# Patient Record
Sex: MALE | Race: WHITE | Employment: FULL TIME | URBAN - METROPOLITAN AREA
[De-identification: names, ages, dates, MRNs, and addresses within clinical notes are randomized per-mention and may not be internally consistent; named-entity substitution may affect disease eponyms.]

---

## 2017-04-14 ENCOUNTER — HOSPITAL ENCOUNTER (OUTPATIENT)
Dept: GENERAL RADIOLOGY | Age: 37
Discharge: HOME | End: 2017-04-14
Attending: FAMILY MEDICINE
Payer: COMMERCIAL

## 2017-04-14 ENCOUNTER — OFFICE VISIT (OUTPATIENT)
Dept: FAMILY MEDICINE CLINIC | Facility: CLINIC | Age: 37
End: 2017-04-14

## 2017-04-14 VITALS
SYSTOLIC BLOOD PRESSURE: 134 MMHG | WEIGHT: 187 LBS | TEMPERATURE: 98 F | BODY MASS INDEX: 25 KG/M2 | HEART RATE: 62 BPM | DIASTOLIC BLOOD PRESSURE: 84 MMHG

## 2017-04-14 DIAGNOSIS — S93.491A: ICD-10-CM

## 2017-04-14 DIAGNOSIS — S93.491A: Primary | ICD-10-CM

## 2017-04-14 PROCEDURE — 99212 OFFICE O/P EST SF 10 MIN: CPT | Performed by: FAMILY MEDICINE

## 2017-04-14 PROCEDURE — 99213 OFFICE O/P EST LOW 20 MIN: CPT | Performed by: FAMILY MEDICINE

## 2017-04-14 PROCEDURE — A6449 LT COMPRES BAND >=3" <5"/YD: HCPCS | Performed by: FAMILY MEDICINE

## 2017-04-14 PROCEDURE — 73610 X-RAY EXAM OF ANKLE: CPT

## 2017-04-14 NOTE — PROGRESS NOTES
Blood pressure 134/84, pulse 62, temperature 98 °F (36.7 °C), temperature source Oral, weight 187 lb (84.823 kg). Right ankle sprain that occurred today. Twisted his ankle by inversion stepping in a pothole.   Was unable to bear weight on it in the early

## 2017-04-17 ENCOUNTER — TELEPHONE (OUTPATIENT)
Dept: FAMILY MEDICINE CLINIC | Facility: CLINIC | Age: 37
End: 2017-04-17

## 2017-04-17 NOTE — TELEPHONE ENCOUNTER
Spoke with patient (identified name and ), results reviewed and agrees with plan. Patient reports decresing pain and swelling, plans to continue ace wrap and minimize weight bearing for the first week.  Follow up if pain or swelling persists

## 2017-04-17 NOTE — TELEPHONE ENCOUNTER
----- Message from Sera Patel DO sent at 4/14/2017  4:23 PM CDT -----  ANKLE X-RAY NEGATIVE FOR FRACTURE.

## 2018-10-27 ENCOUNTER — IMMUNIZATION (OUTPATIENT)
Dept: FAMILY MEDICINE CLINIC | Facility: CLINIC | Age: 38
End: 2018-10-27
Payer: COMMERCIAL

## 2018-10-27 DIAGNOSIS — Z23 NEED FOR VACCINATION: ICD-10-CM

## 2018-10-27 PROCEDURE — 90471 IMMUNIZATION ADMIN: CPT | Performed by: NURSE PRACTITIONER

## 2018-10-27 PROCEDURE — 90686 IIV4 VACC NO PRSV 0.5 ML IM: CPT | Performed by: NURSE PRACTITIONER

## 2020-02-11 ENCOUNTER — TELEPHONE (OUTPATIENT)
Dept: FAMILY MEDICINE CLINIC | Facility: CLINIC | Age: 40
End: 2020-02-11

## 2020-02-11 NOTE — TELEPHONE ENCOUNTER
Please see below, patient scheduled appointment via [employer].      Appointment For: Santos Sue ([id number])   Visit Type: Kita Shirley ([id number])      2/13/2020   10:00 AM  20 mins. Andrew Cook DO   [facility]      Patient Comments:   Having

## 2020-02-12 NOTE — TELEPHONE ENCOUNTER
Called patient, states \"  been feeling off \" lately,  Had \"epiosde \" last month and then again Sunday   Feels like \"tingling,numbness \" usually in his legs,can full weight bear both legs,     States \" can wait until the appointment tomorrow \"    Ad

## 2020-02-13 ENCOUNTER — LAB ENCOUNTER (OUTPATIENT)
Dept: LAB | Age: 40
End: 2020-02-13
Attending: FAMILY MEDICINE
Payer: COMMERCIAL

## 2020-02-13 ENCOUNTER — OFFICE VISIT (OUTPATIENT)
Dept: FAMILY MEDICINE CLINIC | Facility: CLINIC | Age: 40
End: 2020-02-13
Payer: COMMERCIAL

## 2020-02-13 VITALS
HEART RATE: 64 BPM | TEMPERATURE: 98 F | WEIGHT: 194 LBS | RESPIRATION RATE: 20 BRPM | BODY MASS INDEX: 25.99 KG/M2 | DIASTOLIC BLOOD PRESSURE: 95 MMHG | SYSTOLIC BLOOD PRESSURE: 143 MMHG | HEIGHT: 72.5 IN

## 2020-02-13 DIAGNOSIS — R20.2 PARESTHESIAS: Primary | ICD-10-CM

## 2020-02-13 DIAGNOSIS — R20.2 PARESTHESIAS: ICD-10-CM

## 2020-02-13 LAB
ALBUMIN SERPL-MCNC: 4.3 G/DL (ref 3.4–5)
ALBUMIN/GLOB SERPL: 1.2 {RATIO} (ref 1–2)
ALP LIVER SERPL-CCNC: 47 U/L (ref 45–117)
ALT SERPL-CCNC: 20 U/L (ref 16–61)
ANION GAP SERPL CALC-SCNC: 5 MMOL/L (ref 0–18)
AST SERPL-CCNC: 19 U/L (ref 15–37)
BASOPHILS # BLD AUTO: 0.03 X10(3) UL (ref 0–0.2)
BASOPHILS NFR BLD AUTO: 0.6 %
BILIRUB SERPL-MCNC: 1 MG/DL (ref 0.1–2)
BUN BLD-MCNC: 12 MG/DL (ref 7–18)
BUN/CREAT SERPL: 11.1 (ref 10–20)
CALCIUM BLD-MCNC: 9.3 MG/DL (ref 8.5–10.1)
CHLORIDE SERPL-SCNC: 103 MMOL/L (ref 98–112)
CO2 SERPL-SCNC: 30 MMOL/L (ref 21–32)
CREAT BLD-MCNC: 1.08 MG/DL (ref 0.7–1.3)
DEPRECATED RDW RBC AUTO: 41.5 FL (ref 35.1–46.3)
EOSINOPHIL # BLD AUTO: 0.13 X10(3) UL (ref 0–0.7)
EOSINOPHIL NFR BLD AUTO: 2.6 %
ERYTHROCYTE [DISTWIDTH] IN BLOOD BY AUTOMATED COUNT: 12.5 % (ref 11–15)
EST. AVERAGE GLUCOSE BLD GHB EST-MCNC: 100 MG/DL (ref 68–126)
GLOBULIN PLAS-MCNC: 3.6 G/DL (ref 2.8–4.4)
GLUCOSE BLD-MCNC: 83 MG/DL (ref 70–99)
HBA1C MFR BLD HPLC: 5.1 % (ref ?–5.7)
HCT VFR BLD AUTO: 49.7 % (ref 39–53)
HGB BLD-MCNC: 16.9 G/DL (ref 13–17.5)
IMM GRANULOCYTES # BLD AUTO: 0.01 X10(3) UL (ref 0–1)
IMM GRANULOCYTES NFR BLD: 0.2 %
LYMPHOCYTES # BLD AUTO: 1.75 X10(3) UL (ref 1–4)
LYMPHOCYTES NFR BLD AUTO: 34.7 %
M PROTEIN MFR SERPL ELPH: 7.9 G/DL (ref 6.4–8.2)
MCH RBC QN AUTO: 30.7 PG (ref 26–34)
MCHC RBC AUTO-ENTMCNC: 34 G/DL (ref 31–37)
MCV RBC AUTO: 90.4 FL (ref 80–100)
MONOCYTES # BLD AUTO: 0.48 X10(3) UL (ref 0.1–1)
MONOCYTES NFR BLD AUTO: 9.5 %
NEUTROPHILS # BLD AUTO: 2.65 X10 (3) UL (ref 1.5–7.7)
NEUTROPHILS # BLD AUTO: 2.65 X10(3) UL (ref 1.5–7.7)
NEUTROPHILS NFR BLD AUTO: 52.4 %
OSMOLALITY SERPL CALC.SUM OF ELEC: 285 MOSM/KG (ref 275–295)
PATIENT FASTING Y/N/NP: YES
PLATELET # BLD AUTO: 260 10(3)UL (ref 150–450)
POTASSIUM SERPL-SCNC: 3.8 MMOL/L (ref 3.5–5.1)
RBC # BLD AUTO: 5.5 X10(6)UL (ref 4.3–5.7)
SODIUM SERPL-SCNC: 138 MMOL/L (ref 136–145)
TSI SER-ACNC: 2.08 MIU/ML (ref 0.36–3.74)
VIT B12 SERPL-MCNC: 281 PG/ML (ref 193–986)
WBC # BLD AUTO: 5.1 X10(3) UL (ref 4–11)

## 2020-02-13 PROCEDURE — 86780 TREPONEMA PALLIDUM: CPT

## 2020-02-13 PROCEDURE — 36415 COLL VENOUS BLD VENIPUNCTURE: CPT

## 2020-02-13 PROCEDURE — 80053 COMPREHEN METABOLIC PANEL: CPT

## 2020-02-13 PROCEDURE — 87389 HIV-1 AG W/HIV-1&-2 AB AG IA: CPT

## 2020-02-13 PROCEDURE — 85025 COMPLETE CBC W/AUTO DIFF WBC: CPT

## 2020-02-13 PROCEDURE — 84443 ASSAY THYROID STIM HORMONE: CPT

## 2020-02-13 PROCEDURE — 82607 VITAMIN B-12: CPT

## 2020-02-13 PROCEDURE — 99215 OFFICE O/P EST HI 40 MIN: CPT | Performed by: FAMILY MEDICINE

## 2020-02-13 PROCEDURE — 83036 HEMOGLOBIN GLYCOSYLATED A1C: CPT

## 2020-02-13 NOTE — PROGRESS NOTES
Tired  Tingling in legs martina lopezs then went away   now coming back  Sometime in arms and cheeks. (face)    No lower back pain   Has rt neck issue mild. No dm  mgf and dm    Not peeing a lot. Does drink a lot of water but not more so.     Works as was spent with the patient with the majority of time was spent in discussion. 40 minutes was spent discussing these issues. We discussed the pathogenesis of their disease and the treatment options. More than 50% of our time was spent in discussion.

## 2020-02-14 LAB — T PALLIDUM AB SER QL: NEGATIVE

## 2020-02-27 ENCOUNTER — PROCEDURE VISIT (OUTPATIENT)
Dept: NEUROLOGY | Facility: CLINIC | Age: 40
End: 2020-02-27
Payer: COMMERCIAL

## 2020-02-27 VITALS — WEIGHT: 190 LBS | BODY MASS INDEX: 25.73 KG/M2 | HEIGHT: 72 IN

## 2020-02-27 PROCEDURE — 95913 NRV CNDJ TEST 13/> STUDIES: CPT | Performed by: OTHER

## 2020-02-27 PROCEDURE — 95886 MUSC TEST DONE W/N TEST COMP: CPT | Performed by: OTHER

## 2020-02-27 NOTE — PROCEDURES
HYSICAL:    Cranial nerves grossly normal. Motor examination showed strength to be 5 of 5 throughout.      HISTORY:    Nik Mcdonald is a 35year old male with a complaint of numbness that came in every December 2019, resolved, but then came back again in Right deltoid, bicep, triceps, EDC, PT, FDI (hand) muscles were tested.     Motor NCS      Nerve / Sites Muscle Latency Amplitude Rel Amp Durat Segments Distance Lat Diff Velocity     ms mV % ms  cm ms m/s   R Median - APB      Wrist APB 3.33 12.7 100 7.92 ms ms µV µV  ms   R Median - DigII UlnarV      Median Wrist Dig II 3.07 3.96 33.8 73.1 Median Wrist - Dig II       Ulnar Wrist Dig V 3.18 4.06 24.9 61.6 Ulnar Wrist - Median Wrist 0.10   L Median - DigII UlnarV      Median Wrist Dig II 2.55 3.44 32.9 65. R. First dorsal interosseous Ulnar C8-T1 N None None None None Normal N N N N   L. Vastus medialis Femoral L2-L4 N None None None None Normal N N N N   R. Vastus medialis Femoral L2-L4 N None None None None Normal N N N N   L.  Vastus lateralis Femoral L2-L

## 2020-03-03 ENCOUNTER — TELEPHONE (OUTPATIENT)
Dept: FAMILY MEDICINE CLINIC | Facility: CLINIC | Age: 40
End: 2020-03-03

## 2020-03-03 DIAGNOSIS — R20.0 LEG NUMBNESS: Primary | ICD-10-CM

## 2020-03-03 NOTE — TELEPHONE ENCOUNTER
Per request pt is to follow-up with orthopedics with Dr. Miguel A Meyer. Referral pending. Please review and sign.

## 2020-03-12 ENCOUNTER — APPOINTMENT (OUTPATIENT)
Dept: LAB | Age: 40
End: 2020-03-12
Attending: FAMILY MEDICINE
Payer: COMMERCIAL

## 2020-03-12 ENCOUNTER — OFFICE VISIT (OUTPATIENT)
Dept: FAMILY MEDICINE CLINIC | Facility: CLINIC | Age: 40
End: 2020-03-12
Payer: COMMERCIAL

## 2020-03-12 VITALS
WEIGHT: 194 LBS | DIASTOLIC BLOOD PRESSURE: 85 MMHG | HEIGHT: 72 IN | HEART RATE: 56 BPM | BODY MASS INDEX: 26.28 KG/M2 | SYSTOLIC BLOOD PRESSURE: 125 MMHG

## 2020-03-12 DIAGNOSIS — R20.2 PARESTHESIAS: ICD-10-CM

## 2020-03-12 DIAGNOSIS — Z00.00 ANNUAL PHYSICAL EXAM: Primary | ICD-10-CM

## 2020-03-12 DIAGNOSIS — Z00.00 ANNUAL PHYSICAL EXAM: ICD-10-CM

## 2020-03-12 LAB
CHOLEST SMN-MCNC: 213 MG/DL (ref ?–200)
HDLC SERPL-MCNC: 53 MG/DL (ref 40–59)
LDLC SERPL CALC-MCNC: 135 MG/DL (ref ?–100)
NONHDLC SERPL-MCNC: 160 MG/DL (ref ?–130)
PATIENT FASTING Y/N/NP: YES
TRIGL SERPL-MCNC: 125 MG/DL (ref 30–149)
VLDLC SERPL CALC-MCNC: 25 MG/DL (ref 0–30)

## 2020-03-12 PROCEDURE — 36415 COLL VENOUS BLD VENIPUNCTURE: CPT

## 2020-03-12 PROCEDURE — 99396 PREV VISIT EST AGE 40-64: CPT | Performed by: FAMILY MEDICINE

## 2020-03-12 PROCEDURE — 80061 LIPID PANEL: CPT

## 2020-03-12 NOTE — PROGRESS NOTES
Pt here for his physical  REASON FOR VISIT:    Graciela Gibson is a 35year old male who presents for an [address]. Feels well   parestesias resolved. \"I would say 100% gone\"  emg results reviewed.         There is no problem list on file INFORMATION:   History reviewed. No pertinent past medical history. History reviewed. No pertinent surgical history.    Family History   Problem Relation Age of Onset   • Cancer Maternal Grandmother         cervical   • Other (Other) Mother         good h deferred  MUSCULOSKELETAL: back is not tender, FROM of the back  EXTREMITIES: no cyanosis, clubbing or edema  NEURO: Oriented times three, cranial nerves are intact, motor and sensory are grossly intact      ASSESSMENT AND OTHER RELEVANT CHRONIC CONDITIONS

## 2021-04-23 ENCOUNTER — HOSPITAL ENCOUNTER (OUTPATIENT)
Age: 41
Discharge: HOME | End: 2021-04-23
Attending: EMERGENCY MEDICINE
Payer: COMMERCIAL

## 2021-04-23 ENCOUNTER — APPOINTMENT (OUTPATIENT)
Dept: GENERAL RADIOLOGY | Age: 41
End: 2021-04-23
Attending: NURSE PRACTITIONER
Payer: COMMERCIAL

## 2021-04-23 VITALS
OXYGEN SATURATION: 99 % | TEMPERATURE: 98 F | HEART RATE: 57 BPM | SYSTOLIC BLOOD PRESSURE: 134 MMHG | DIASTOLIC BLOOD PRESSURE: 84 MMHG | RESPIRATION RATE: 16 BRPM

## 2021-04-23 DIAGNOSIS — S62.635A CLOSED DISPLACED FRACTURE OF DISTAL PHALANX OF LEFT RING FINGER, INITIAL ENCOUNTER: Primary | ICD-10-CM

## 2021-04-23 PROCEDURE — 26750 TREAT FINGER FRACTURE EACH: CPT

## 2021-04-23 PROCEDURE — 73140 X-RAY EXAM OF FINGER(S): CPT | Performed by: EMERGENCY MEDICINE

## 2021-04-23 PROCEDURE — 99203 OFFICE O/P NEW LOW 30 MIN: CPT

## 2021-04-23 PROCEDURE — 99213 OFFICE O/P EST LOW 20 MIN: CPT

## 2021-04-23 NOTE — ED PROVIDER NOTES
Patient Seen in: [facility]      History   Patient presents with:  Arm or Hand Injury: Severly pinched left ring finger - Entered by patient    Stated Complaint: Arm or Hand Injury - Severly pinched left ring finger    HPI/Subjective:   HPI Resp 16   SpO2 99%         Physical Exam  Vitals and nursing note reviewed. Constitutional:       Appearance: Normal appearance. He is well-developed. He is not ill-appearing or diaphoretic. HENT:      Head: Normocephalic and atraumatic.       Right E fracture of the proximal and dorsal aspect of the distal phalanx of the 4th digit. Moderate narrowing of the DIP joint and mild narrowing of the PIP joint of the 4th digit. No dislocation. SOFT TISSUES: Negative. No visible soft tissue swelling.  EFFUSION

## 2023-05-12 ENCOUNTER — OFFICE VISIT (OUTPATIENT)
Dept: FAMILY MEDICINE CLINIC | Facility: CLINIC | Age: 43
End: 2023-05-12
Payer: COMMERCIAL

## 2023-05-12 VITALS
TEMPERATURE: 98 F | HEART RATE: 70 BPM | BODY MASS INDEX: 25.41 KG/M2 | OXYGEN SATURATION: 98 % | WEIGHT: 187.63 LBS | DIASTOLIC BLOOD PRESSURE: 86 MMHG | HEIGHT: 72 IN | RESPIRATION RATE: 16 BRPM | SYSTOLIC BLOOD PRESSURE: 116 MMHG

## 2023-05-12 DIAGNOSIS — E66.3 OVERWEIGHT (BMI 25.0-29.9): ICD-10-CM

## 2023-05-12 DIAGNOSIS — E78.00 HYPERCHOLESTEROLEMIA: ICD-10-CM

## 2023-05-12 DIAGNOSIS — Z00.00 ROUTINE MEDICAL EXAM: Primary | ICD-10-CM

## 2023-05-12 PROCEDURE — 3008F BODY MASS INDEX DOCD: CPT | Performed by: STUDENT IN AN ORGANIZED HEALTH CARE EDUCATION/TRAINING PROGRAM

## 2023-05-12 PROCEDURE — 3079F DIAST BP 80-89 MM HG: CPT | Performed by: STUDENT IN AN ORGANIZED HEALTH CARE EDUCATION/TRAINING PROGRAM

## 2023-05-12 PROCEDURE — 3074F SYST BP LT 130 MM HG: CPT | Performed by: STUDENT IN AN ORGANIZED HEALTH CARE EDUCATION/TRAINING PROGRAM

## 2023-05-12 PROCEDURE — 99386 PREV VISIT NEW AGE 40-64: CPT | Performed by: STUDENT IN AN ORGANIZED HEALTH CARE EDUCATION/TRAINING PROGRAM

## 2025-03-04 ENCOUNTER — OFFICE VISIT (OUTPATIENT)
Dept: FAMILY MEDICINE CLINIC | Facility: CLINIC | Age: 45
End: 2025-03-04
Payer: COMMERCIAL

## 2025-03-04 VITALS
HEART RATE: 65 BPM | WEIGHT: 195.81 LBS | BODY MASS INDEX: 26.52 KG/M2 | SYSTOLIC BLOOD PRESSURE: 120 MMHG | DIASTOLIC BLOOD PRESSURE: 84 MMHG | HEIGHT: 72 IN | OXYGEN SATURATION: 97 % | RESPIRATION RATE: 20 BRPM | TEMPERATURE: 98 F

## 2025-03-04 DIAGNOSIS — H66.91 ACUTE RIGHT OTITIS MEDIA: Primary | ICD-10-CM

## 2025-03-04 PROCEDURE — 99213 OFFICE O/P EST LOW 20 MIN: CPT | Performed by: NURSE PRACTITIONER

## 2025-03-04 RX ORDER — AMOXICILLIN 875 MG/1
875 TABLET, COATED ORAL 2 TIMES DAILY
Qty: 20 TABLET | Refills: 0 | Status: SHIPPED | OUTPATIENT
Start: 2025-03-04 | End: 2025-03-14

## 2025-03-04 NOTE — PATIENT INSTRUCTIONS
It is very important to complete full course of antibiotic.   Flonase   Acetaminophen or ibuprofen according to package instructions as needed for pain  Call or return if symptoms worsen, do not improve in 3 days, or if fever of 100.4 or greater persists for 72 hours.        Middle Ear Infection (Otitis Media)   What is a middle ear infection?   A middle ear infection is an infection of the air-filled space in the ear behind the eardrum. Anyone can get an ear infection, but ear infections are more common in children less than 8 years old.   How does it occur?   Ear infections usually begin with a viral infection of the nose and throat. For example, a cold might lead to an infection of the ear. Ear infections may also occur when you have allergies. The viral infection or allergic reaction can cause swelling of the tube between your ear and throat (the eustachian tube). The swelling may trap bacteria in your middle ear, resulting in a bacterial infection.   Pressure from the buildup of pus or fluid in the ear sometimes causes the eardrum to tear (rupture). The eardrum is a thin membrane that separates the delicate parts of the middle ear from the air and moisture in the ear canal.   What are the symptoms?   You may have one or more of these symptoms:   earache   hearing loss   feeling of blockage in the ear   fever   dizziness.   How is it diagnosed?   Your healthcare provider will ask about your symptoms and look at your eardrum.   Your healthcare provider may check for fluid in the ear using a light called an otoscope to look into the ear canal. A puff of air is blown into the ear and your provider looks for movement of the eardrum. If there is fluid behind the eardrum, the membrane will not move well.   How is it treated?   Antibiotic medicine is a common treatment for ear infections. However, recent studies have shown that the symptoms of ear infections often go away in a couple of days without antibiotics.  Bacteria can become resistant to antibiotics, and the medicine may cause side effects. For these reasons, your healthcare provider may wait 1 to 3 days to see if the symptoms go away on their own before prescribing an antibiotic.   Your provider may recommend a decongestant (tablets or a nasal spray) to help clear the eustachian tube. This may help relieve pressure in the middle ear. For pain take a nonprescription pain reliever such as acetaminophen (Tylenol) or ibuprofen. Carefully follow the directions for using medicines, even if they are nonprescription.   How long will the effects last?   In most cases you should feel better in 2 to 3 days.   If you are taking an antibiotic and your eardrum has not returned to normal when your provider examines you again, you may need to take a different antibiotic or other medicine. In this case, it may take another 1 to 2 weeks before your ear feels normal again.   What can I do to take care of myself?   Follow your healthcare provider's instructions.   If you are taking an antibiotic, take all of it according to the directions, even when the symptoms have gone away before you have finished it.   To help relieve pain, put a warm moist washcloth or a hot water bottle over the ear.   If you have discharge from your ear, you can wipe it away with a washcloth and loosely plug the ear with cotton to catch further drainage. If you have a lot of fluid and pus draining from your ear, the eardrum has probably ruptured. Ask your healthcare provider how to care for the ear if you have discharge. If the discharge is caused by a ruptured eardrum, then you will need to protect the ear from water. You will need one or more follow-up appointments to check the healing of your eardrum.   If you have a fever:   Rest until your temperature has fallen below 100°F (37.8°C). Then become as active as is comfortable.   Ask your provider if you can take aspirin, acetaminophen, or ibuprofen to control  your fever. Anyone under the age of 21 with a viral illness should not take aspirin because of the increased risk of Reye's syndrome.   Keep a daily record of your temperature.   Call your healthcare provider if you have:   a temperature of 101.5 degrees F (38.6 degrees C) or higher that persists even after you take acetaminophen, aspirin, or ibuprofen   a severe headache or worsening pain around the ear   swelling around the ear   increasing dizziness   worsening of your hearing   weakness of one side of your face.   Keep all your appointments. Your healthcare provider may want you to have one or more follow-up exams until signs of inflammation and infection have disappeared.   How can I prevent an ear infection from occurring?   If you tend to get ear infections often, ask your healthcare provider if you need to be checked for allergies. Getting treatment for allergies may help prevent ear infections.   Ask if using decongestants when you have a cold may help prevent you from getting ear infections.   Developed by [employer]   Published by [employer].   Last modified: 2008-01-15

## 2025-03-04 NOTE — PROGRESS NOTES
CHIEF COMPLAINT:     Chief Complaint   Patient presents with    Ear Problem     Right ear- noticed it yesterday. Possible ear infection with vertigo symptoms and feeling unbalanced  - Entered by patient       HPI:   Nicolas Gilmore is a 44 year old male who presents to clinic today with complaints of right ear discomfort for 1 day.   Pain is described as achy, pressure.  Reports: decreased hearing, no ear drainage, + history of ear infections as a child.    Associated symptoms: feeling unbalance and some vertigo.  Denies fever, chills, nasal congestion, sore throat, cough.  Reports viral infection about 2 weeks ago  Home treatment includes nothing.  No COVID exposure     No current outpatient medications on file.      History reviewed. No pertinent past medical history.   Social History:  Social History     Socioeconomic History    Marital status:    Tobacco Use    Smoking status: Former     Current packs/day: 0.00     Types: Cigarettes     Start date:      Quit date:      Years since quittin.1    Smokeless tobacco: Never   Vaping Use    Vaping status: Never Used   Substance and Sexual Activity    Alcohol use: Yes     Alcohol/week: 5.0 standard drinks of alcohol     Types: 5 Standard drinks or equivalent per week    Drug use: No   Other Topics Concern    Pt has a pacemaker No    Pt has a defibrillator No    Reaction to local anesthetic No        REVIEW OF SYSTEMS:   GENERAL: Feeling well otherwise.    SKIN: no unusual skin lesions or rashes  HEENT: See HPI  LUNGS: No cough, shortness of breath, or wheezing.  CARDIOVASCULAR: No chest pain, palpitations  GI: No N/V/C/D.  NEURO: denies headaches     EXAM:   /84 (BP Location: Left arm, Patient Position: Sitting, Cuff Size: adult)   Pulse 65   Temp 98 °F (36.7 °C) (Oral)   Resp 20   Ht 6' (1.829 m)   Wt 195 lb 12.8 oz (88.8 kg)   SpO2 97%   BMI 26.56 kg/m²   GENERAL: well developed, well nourished, in no apparent distress  SKIN: no  rashes, no suspicious lesions  HEAD: atraumatic, normocephalic  EYES: conjunctiva clear  EARS: Tragus non tender on palpation bilaterally. External auditory canals healthy. No swelling, erythema, or tenderness to bilat mastoid area.    Right TM: erythematous, + bulging, no retraction, + fluid, bony landmarks not visualized.   Left TM: pinkish clear, no bulging, no retraction, no effusion, bony landmarks visualized.  NOSE: nostrils patent, clear nasal discharge, nasal mucosa pink and noninflamed  THROAT: oral mucosa pink, moist. Posterior pharynx is not erythematous or injected. No exudates.  NECK: supple, non-tender  LUNGS: clear to auscultation bilaterally. Breathing is non labored.  CARDIO: RRR without murmur  EXTREMITIES: no cyanosis, clubbing or edema  LYMPH: No auricular lymphadenopathy; + mild right anterior cervical lymphadenopathy.      ASSESSMENT AND PLAN:   Nicolas Gilmore is a 44 year old male who presents with:    ASSESSMENT:  Encounter Diagnosis   Name Primary?    Acute right otitis media Yes       PLAN:   Meds as listed below.  Tylenol/Motrin prn pain.    Risks, benefits, and side effects of medication explained and discussed.  Comfort measures as described in Patient Instructions.    Discussed S&S that require follow-up.     Requested Prescriptions     Signed Prescriptions Disp Refills    amoxicillin 875 MG Oral Tab 20 tablet 0     Sig: Take 1 tablet (875 mg total) by mouth 2 (two) times daily for 10 days. Take with food           Patient Instructions   It is very important to complete full course of antibiotic.   Flonase   Acetaminophen or ibuprofen according to package instructions as needed for pain  Call or return if symptoms worsen, do not improve in 3 days, or if fever of 100.4 or greater persists for 72 hours.        Middle Ear Infection (Otitis Media)   What is a middle ear infection?   A middle ear infection is an infection of the air-filled space in the ear behind the eardrum. Anyone can get  an ear infection, but ear infections are more common in children less than 8 years old.   How does it occur?   Ear infections usually begin with a viral infection of the nose and throat. For example, a cold might lead to an infection of the ear. Ear infections may also occur when you have allergies. The viral infection or allergic reaction can cause swelling of the tube between your ear and throat (the eustachian tube). The swelling may trap bacteria in your middle ear, resulting in a bacterial infection.   Pressure from the buildup of pus or fluid in the ear sometimes causes the eardrum to tear (rupture). The eardrum is a thin membrane that separates the delicate parts of the middle ear from the air and moisture in the ear canal.   What are the symptoms?   You may have one or more of these symptoms:   earache   hearing loss   feeling of blockage in the ear   fever   dizziness.   How is it diagnosed?   Your healthcare provider will ask about your symptoms and look at your eardrum.   Your healthcare provider may check for fluid in the ear using a light called an otoscope to look into the ear canal. A puff of air is blown into the ear and your provider looks for movement of the eardrum. If there is fluid behind the eardrum, the membrane will not move well.   How is it treated?   Antibiotic medicine is a common treatment for ear infections. However, recent studies have shown that the symptoms of ear infections often go away in a couple of days without antibiotics. Bacteria can become resistant to antibiotics, and the medicine may cause side effects. For these reasons, your healthcare provider may wait 1 to 3 days to see if the symptoms go away on their own before prescribing an antibiotic.   Your provider may recommend a decongestant (tablets or a nasal spray) to help clear the eustachian tube. This may help relieve pressure in the middle ear. For pain take a nonprescription pain reliever such as acetaminophen  (Tylenol) or ibuprofen. Carefully follow the directions for using medicines, even if they are nonprescription.   How long will the effects last?   In most cases you should feel better in 2 to 3 days.   If you are taking an antibiotic and your eardrum has not returned to normal when your provider examines you again, you may need to take a different antibiotic or other medicine. In this case, it may take another 1 to 2 weeks before your ear feels normal again.   What can I do to take care of myself?   Follow your healthcare provider's instructions.   If you are taking an antibiotic, take all of it according to the directions, even when the symptoms have gone away before you have finished it.   To help relieve pain, put a warm moist washcloth or a hot water bottle over the ear.   If you have discharge from your ear, you can wipe it away with a washcloth and loosely plug the ear with cotton to catch further drainage. If you have a lot of fluid and pus draining from your ear, the eardrum has probably ruptured. Ask your healthcare provider how to care for the ear if you have discharge. If the discharge is caused by a ruptured eardrum, then you will need to protect the ear from water. You will need one or more follow-up appointments to check the healing of your eardrum.   If you have a fever:   Rest until your temperature has fallen below 100°F (37.8°C). Then become as active as is comfortable.   Ask your provider if you can take aspirin, acetaminophen, or ibuprofen to control your fever. Anyone under the age of 21 with a viral illness should not take aspirin because of the increased risk of Reye's syndrome.   Keep a daily record of your temperature.   Call your healthcare provider if you have:   a temperature of 101.5 degrees F (38.6 degrees C) or higher that persists even after you take acetaminophen, aspirin, or ibuprofen   a severe headache or worsening pain around the ear   swelling around the ear   increasing  dizziness   worsening of your hearing   weakness of one side of your face.   Keep all your appointments. Your healthcare provider may want you to have one or more follow-up exams until signs of inflammation and infection have disappeared.   How can I prevent an ear infection from occurring?   If you tend to get ear infections often, ask your healthcare provider if you need to be checked for allergies. Getting treatment for allergies may help prevent ear infections.   Ask if using decongestants when you have a cold may help prevent you from getting ear infections.   Developed by [employer]   Published by [employer].   Last modified: 2008-01-15        Patient voiced understanding and is in agreement with treatment plan

## 2025-03-15 ENCOUNTER — OFFICE VISIT (OUTPATIENT)
Dept: FAMILY MEDICINE CLINIC | Facility: CLINIC | Age: 45
End: 2025-03-15
Payer: COMMERCIAL

## 2025-03-15 ENCOUNTER — LAB ENCOUNTER (OUTPATIENT)
Dept: LAB | Facility: HOSPITAL | Age: 45
End: 2025-03-15
Attending: STUDENT IN AN ORGANIZED HEALTH CARE EDUCATION/TRAINING PROGRAM
Payer: COMMERCIAL

## 2025-03-15 VITALS
WEIGHT: 192 LBS | TEMPERATURE: 98 F | RESPIRATION RATE: 18 BRPM | HEIGHT: 72 IN | SYSTOLIC BLOOD PRESSURE: 114 MMHG | DIASTOLIC BLOOD PRESSURE: 80 MMHG | BODY MASS INDEX: 26.01 KG/M2 | OXYGEN SATURATION: 97 % | HEART RATE: 82 BPM

## 2025-03-15 DIAGNOSIS — E66.3 OVERWEIGHT (BMI 25.0-29.9): ICD-10-CM

## 2025-03-15 DIAGNOSIS — Z00.00 ROUTINE MEDICAL EXAM: ICD-10-CM

## 2025-03-15 DIAGNOSIS — E78.00 HYPERCHOLESTEROLEMIA: ICD-10-CM

## 2025-03-15 DIAGNOSIS — Z12.11 SCREENING FOR COLON CANCER: ICD-10-CM

## 2025-03-15 DIAGNOSIS — Z00.00 ROUTINE MEDICAL EXAM: Primary | ICD-10-CM

## 2025-03-15 PROBLEM — Z15.09 BRCA2 POSITIVE: Status: ACTIVE | Noted: 2025-03-15

## 2025-03-15 PROBLEM — Z15.01 BRCA2 POSITIVE: Status: ACTIVE | Noted: 2025-03-15

## 2025-03-15 LAB
ALBUMIN SERPL-MCNC: 4.9 G/DL (ref 3.2–4.8)
ALBUMIN/GLOB SERPL: 2 {RATIO} (ref 1–2)
ALP LIVER SERPL-CCNC: 59 U/L
ALT SERPL-CCNC: 51 U/L
ANION GAP SERPL CALC-SCNC: 6 MMOL/L (ref 0–18)
AST SERPL-CCNC: 33 U/L (ref ?–34)
BILIRUB SERPL-MCNC: 0.6 MG/DL (ref 0.3–1.2)
BUN BLD-MCNC: 9 MG/DL (ref 9–23)
BUN/CREAT SERPL: 9.4 (ref 10–20)
CALCIUM BLD-MCNC: 9.3 MG/DL (ref 8.7–10.4)
CHLORIDE SERPL-SCNC: 107 MMOL/L (ref 98–112)
CHOLEST SERPL-MCNC: 191 MG/DL (ref ?–200)
CO2 SERPL-SCNC: 29 MMOL/L (ref 21–32)
CREAT BLD-MCNC: 0.96 MG/DL
DEPRECATED RDW RBC AUTO: 39.4 FL (ref 35.1–46.3)
EGFRCR SERPLBLD CKD-EPI 2021: 99 ML/MIN/1.73M2 (ref 60–?)
ERYTHROCYTE [DISTWIDTH] IN BLOOD BY AUTOMATED COUNT: 12.2 % (ref 11–15)
EST. AVERAGE GLUCOSE BLD GHB EST-MCNC: 111 MG/DL (ref 68–126)
FASTING PATIENT LIPID ANSWER: YES
FASTING STATUS PATIENT QL REPORTED: YES
GLOBULIN PLAS-MCNC: 2.4 G/DL (ref 2–3.5)
GLUCOSE BLD-MCNC: 78 MG/DL (ref 70–99)
HBA1C MFR BLD: 5.5 % (ref ?–5.7)
HCT VFR BLD AUTO: 42.4 %
HDLC SERPL-MCNC: 39 MG/DL (ref 40–59)
HGB BLD-MCNC: 14.8 G/DL
LDLC SERPL CALC-MCNC: 137 MG/DL (ref ?–100)
MCH RBC QN AUTO: 30.6 PG (ref 26–34)
MCHC RBC AUTO-ENTMCNC: 34.9 G/DL (ref 31–37)
MCV RBC AUTO: 87.6 FL
NONHDLC SERPL-MCNC: 152 MG/DL (ref ?–130)
OSMOLALITY SERPL CALC.SUM OF ELEC: 292 MOSM/KG (ref 275–295)
PLATELET # BLD AUTO: 231 10(3)UL (ref 150–450)
POTASSIUM SERPL-SCNC: 4.2 MMOL/L (ref 3.5–5.1)
PROT SERPL-MCNC: 7.3 G/DL (ref 5.7–8.2)
RBC # BLD AUTO: 4.84 X10(6)UL
SODIUM SERPL-SCNC: 142 MMOL/L (ref 136–145)
TRIGL SERPL-MCNC: 83 MG/DL (ref 30–149)
TSI SER-ACNC: 1.47 UIU/ML (ref 0.55–4.78)
VLDLC SERPL CALC-MCNC: 15 MG/DL (ref 0–30)
WBC # BLD AUTO: 5.7 X10(3) UL (ref 4–11)

## 2025-03-15 PROCEDURE — 80053 COMPREHEN METABOLIC PANEL: CPT

## 2025-03-15 PROCEDURE — 80061 LIPID PANEL: CPT

## 2025-03-15 PROCEDURE — 36415 COLL VENOUS BLD VENIPUNCTURE: CPT

## 2025-03-15 PROCEDURE — 84443 ASSAY THYROID STIM HORMONE: CPT

## 2025-03-15 PROCEDURE — 83036 HEMOGLOBIN GLYCOSYLATED A1C: CPT

## 2025-03-15 PROCEDURE — 85027 COMPLETE CBC AUTOMATED: CPT

## 2025-03-15 PROCEDURE — 99396 PREV VISIT EST AGE 40-64: CPT | Performed by: STUDENT IN AN ORGANIZED HEALTH CARE EDUCATION/TRAINING PROGRAM

## 2025-03-15 RX ORDER — FLUTICASONE PROPIONATE 50 MCG
SPRAY, SUSPENSION (ML) NASAL AS NEEDED
COMMUNITY

## 2025-03-15 NOTE — PROGRESS NOTES
Subjective:   Nicolas Gilmore is a 44 year old male who presents for Physical     44-year-old male coming in for his routine physical.  No acute complaints.  Eats somewhat of a healthy diet including fruits and vegetables.  Exercises by doing daily walks, golfing and coaching baseball.  Follows up with an eye doctor and now reestablished with a dentist.  No immediate family history of colon cancer.  His son was diagnosed with neuroblastoma in 2019.  He did genetic testing and was found to be BRCA2 positive.  Both patient and his wife got genetic testing as well, wife is negative, patient is positive for BRCA2.  Additional family members were notified.  Denies chest pain, SOB, nausea, vomiting, diarrhea.    History/Other:    Chief Complaint Reviewed and Verified  No Further Nursing Notes to   Review  Tobacco Reviewed  Allergies Reviewed  Medications Reviewed    Problem List Reviewed  Medical History Reviewed  Surgical History   Reviewed  Family History Reviewed  Social History Reviewed         Tobacco:  He smoked tobacco in the past but quit greater than 12 months ago.  Social History     Tobacco Use   Smoking Status Former    Current packs/day: 0.00    Types: Cigarettes    Start date:     Quit date:     Years since quittin.2    Passive exposure: Past   Smokeless Tobacco Never        Current Outpatient Medications   Medication Sig Dispense Refill    fluticasone propionate 50 MCG/ACT Nasal Suspension by Nasal route as needed for Rhinitis.           Review of Systems:  Review of Systems   Constitutional:  Negative for chills, diaphoresis and fever.   HENT:  Negative for congestion, ear discharge, ear pain, sinus pressure, sinus pain and sore throat.    Eyes:  Negative for pain and discharge.   Respiratory:  Negative for cough, chest tightness, shortness of breath and wheezing.    Cardiovascular:  Negative for chest pain and palpitations.   Gastrointestinal:  Negative for abdominal pain, diarrhea,  nausea and vomiting.   Endocrine: Negative for cold intolerance and heat intolerance.   Genitourinary:  Negative for dysuria, flank pain, frequency and urgency.   Musculoskeletal:  Negative for joint swelling.   Skin:  Negative for rash.   Neurological:  Negative for dizziness, syncope and headaches.   Psychiatric/Behavioral:  Negative for confusion and hallucinations.        Objective:   /80 (BP Location: Right arm, Patient Position: Sitting, Cuff Size: adult)   Pulse 82   Temp 97.8 °F (36.6 °C) (Temporal)   Resp 18   Ht 6' (1.829 m)   Wt 192 lb (87.1 kg)   SpO2 97%   BMI 26.04 kg/m²  Estimated body mass index is 26.04 kg/m² as calculated from the following:    Height as of this encounter: 6' (1.829 m).    Weight as of this encounter: 192 lb (87.1 kg).  Physical Exam  Constitutional:       General: He is not in acute distress.     Appearance: Normal appearance. He is not ill-appearing or toxic-appearing.   HENT:      Head: Normocephalic and atraumatic.      Right Ear: Tympanic membrane and ear canal normal.      Left Ear: Tympanic membrane and ear canal normal.      Mouth/Throat:      Mouth: Mucous membranes are moist.      Pharynx: Oropharynx is clear. No oropharyngeal exudate or posterior oropharyngeal erythema.   Eyes:      Extraocular Movements: Extraocular movements intact.      Pupils: Pupils are equal, round, and reactive to light.   Cardiovascular:      Rate and Rhythm: Normal rate and regular rhythm.      Heart sounds: Normal heart sounds. No murmur heard.     No gallop.   Pulmonary:      Effort: Pulmonary effort is normal. No respiratory distress.      Breath sounds: Normal breath sounds. No stridor. No wheezing, rhonchi or rales.   Abdominal:      General: Bowel sounds are normal.      Palpations: Abdomen is soft.      Tenderness: There is no abdominal tenderness. There is no right CVA tenderness, left CVA tenderness or guarding.   Musculoskeletal:         General: No swelling.      Cervical  back: Normal range of motion and neck supple. No rigidity or tenderness.      Right lower leg: No edema.      Left lower leg: No edema.   Skin:     General: Skin is warm and dry.   Neurological:      General: No focal deficit present.      Mental Status: He is alert and oriented to person, place, and time. Mental status is at baseline.      Cranial Nerves: No cranial nerve deficit.      Sensory: No sensory deficit.      Motor: Motor function is intact. No weakness.      Gait: Gait normal.   Psychiatric:         Mood and Affect: Mood normal.         Behavior: Behavior normal.         Thought Content: Thought content normal.         Judgment: Judgment normal.         Assessment & Plan:   1. Routine medical exam (Primary)  -Healthy diet and lifestyle.  -Weight loss.  -Exercise as tolerated.  -Dental exam every 6 months or as recommended by dentist.  -Eye exams annually, at least every 2 years or as recommended by specialist.  -     CBC, Platelet; No Differential; Future; Expected date: 03/15/2025  -     Comp Metabolic Panel (14); Future; Expected date: 03/15/2025  -     Hemoglobin A1C; Future; Expected date: 03/15/2025  -     Lipid Panel; Future; Expected date: 03/15/2025  -     TSH W Reflex To Free T4; Future; Expected date: 03/15/2025  2. Hypercholesterolemia  As noted on labs from 2020.  -Healthy diet and lifestyle.  -Weight loss.  -Exercise as tolerated.  -     Lipid Panel; Future; Expected date: 03/15/2025  3. Overweight (BMI 25.0-29.9)  -Healthy diet and lifestyle.  -Weight loss.  -Exercise as tolerated.  -     Hemoglobin A1C; Future; Expected date: 03/15/2025  -     Lipid Panel; Future; Expected date: 03/15/2025  4. Screening for colon cancer  -     GASTRO - INTERNAL          Return in about 1 year (around 3/15/2026) for Routine physical exam visit.    Tone Marshall MD, 3/15/2025, 10:41 AM

## 2025-03-17 ENCOUNTER — TELEPHONE (OUTPATIENT)
Dept: GASTROENTEROLOGY | Facility: CLINIC | Age: 45
End: 2025-03-17

## 2025-03-17 DIAGNOSIS — R79.89 ABNORMAL LFTS: Primary | ICD-10-CM

## 2025-03-17 NOTE — TELEPHONE ENCOUNTER
Spoke with patient and verified date of birth.     Reviewed indication to schedule telephone colon screening appointment.     Confirmed date, time and details for phone screening. Verified best contact number, 2-part GI questionnaire, and no active GI symptoms.     Verbalized understanding and appreciative for call.     Future Appointments   Date Time Provider Department Center   3/21/2025  2:30 PM GI COLON SCREENING [id number] None

## 2025-03-21 ENCOUNTER — NURSE ONLY (OUTPATIENT)
Facility: CLINIC | Age: 45
End: 2025-03-21

## 2025-03-21 DIAGNOSIS — Z12.11 COLON CANCER SCREENING: Primary | ICD-10-CM

## 2025-03-21 NOTE — PROGRESS NOTES
GI Staff:  [facility] Colon Screening Orders    Please schedule: Colonoscopy 22510 with MAC      Please send split dose Golytely bowel prep     Diagnosis: Colon Screening Z12.11     Medication adjustments:   None     >>>Please inform patient if new medications are started after scheduling procedure they need to call clinic to notify us.

## 2025-03-21 NOTE — PROGRESS NOTES
Called patient for scheduled TCS/FIT+ result.   Medications, pharmacy, and allergies reviewed.   Please advise on colonoscopy and bowel prep orders.     Age 45-76 y/o:   › MD preference: None   › Insurance:   [employer]   › Last PCP visit: 3/15/2025  Pcp within [facility]: Tone Marshall MD   › Last CBC: 3/15/2025   › Date of positive FIT: n/a  › H/W/BMI: 72 in / 192 lbs / 26.04 kg/m²     Special comments/notes:    Telephone Colon Screening Questionnaire Yes No   Are you currently experiencing any new/abnormal GI symptoms? [] [x]   If yes, explain:                              Rectal bleeding?            [] [x]   Black stool?              [] [x]   Dysphagia or food \"feeling stuck\" when eating?    [] [x]   Intractable vomiting?          [] [x]   Unexplained weight loss?                        [] [x]   First colonoscopy?                         [x] []   Family history of colon cancer?           [] [x]   Any issues with anesthesia?                   [] [x]   If yes, explain:                                   Any recent complaints of chest pain or shortness of breath?  [] [x]   Referred to a cardiologist?  [] [x]   If yes, explain:             Stroke, MI (heart attack) or stent placement in the last 12 months:  [] [x]   History of  respiratory issues/oxygen/MAK/COPD: [] [x]   If yes, CPAP/BiPAP:                                    History of devices (pacemaker/defibrillator) [] [x]   History of cardiac/CVA/(MI/stroke):           [] [x]     Medications Yes  No   Anticoagulants (except Aspirin):                [] [x]   Diabetic Meds                                                   PO: Hold day before and day of procedure  Insulin:                                    [] [x]   Weight loss meds (phentermine/vyvanse/saxsenda/etc):                   [] [x]   Iron/herbal/multivitamin supplement:                          [] [x]   Usage of marijuana, CBD &/or vape products:   Cannabis -Occasional                      [x] []

## (undated) NOTE — MR AVS SNAPSHOT
[facility]  [address] [place] [address], [address]   Liliana Show               Thank you for choosing us for your health care visit with Roverto Mann. DO Dominick.   We are glad to serve you and happy to provide you with this summary Medical Issues Discussed Today     Sprain of other ligament of right ankle    -  Primary      Instructions and Information about Your Health     None      Allergies as of Apr 14, 2017     No Known Allergies                Today's Vital Signs     BP Pulse T